# Patient Record
Sex: FEMALE | Race: WHITE | NOT HISPANIC OR LATINO | URBAN - METROPOLITAN AREA
[De-identification: names, ages, dates, MRNs, and addresses within clinical notes are randomized per-mention and may not be internally consistent; named-entity substitution may affect disease eponyms.]

---

## 2018-09-21 ENCOUNTER — APPOINTMENT (RX ONLY)
Dept: URBAN - METROPOLITAN AREA CLINIC 116 | Facility: CLINIC | Age: 53
Setting detail: DERMATOLOGY
End: 2018-09-21

## 2018-09-21 DIAGNOSIS — L65.9 NONSCARRING HAIR LOSS, UNSPECIFIED: ICD-10-CM

## 2018-09-21 PROCEDURE — ? TREATMENT REGIMEN

## 2018-09-21 PROCEDURE — ? OTHER

## 2018-09-21 PROCEDURE — 99202 OFFICE O/P NEW SF 15 MIN: CPT

## 2018-09-21 PROCEDURE — ? COUNSELING

## 2018-09-21 ASSESSMENT — LOCATION ZONE DERM: LOCATION ZONE: SCALP

## 2018-09-21 ASSESSMENT — LOCATION DETAILED DESCRIPTION DERM
LOCATION DETAILED: LEFT SUPERIOR PARIETAL SCALP
LOCATION DETAILED: MID-OCCIPITAL SCALP

## 2018-09-21 ASSESSMENT — LOCATION SIMPLE DESCRIPTION DERM
LOCATION SIMPLE: SCALP
LOCATION SIMPLE: POSTERIOR SCALP

## 2018-09-21 NOTE — PROCEDURE: OTHER
Note Text (......Xxx Chief Complaint.): This diagnosis correlates with the
Detail Level: Zone
Other (Free Text): Requested patient to get blood work sent to us. \\n\\nDiscussed with patient getting a punch biopsy, she can come one hour earlier and get topical numbing prior to biopsy.

## 2018-09-21 NOTE — PROCEDURE: TREATMENT REGIMEN
Detail Level: Zone
Plan: Recommended for patient to get blood work (hair pannel) and mammogram along with a colonoscopy. \\n\\nDiscussed vitalize
Otc Regimen: Viviscal vitamin\\nNioxin shampoo\\nMinoxidil 5% (rogaine)

## 2018-09-21 NOTE — HPI: HAIR LOSS
Previous Labs: Yes
How Did The Hair Loss Occur?: sudden in onset
How Severe Is Your Hair Loss?: mild
What Hair Products Do You Use?: Pantene for color treated hair
Additional History: Patient states shes experiencing  abnormal hair loss for the past 2 months. When she went to get her hair done her stylist advised her to see a DrDemetrio about her hair loss problem. She has discontinued dying her hair since then. Patient got blood work done by her pcp Dr. Karma Kahn in July. Vitamin D came back low, she is signing a releas form so we can get her labs. Patient is using regular shampoo and conditioner (Pantene for color treated hair). Patient has a history of being anemic(at a young age). Patient states she went on a no meat diet in March. But has not resumed her normal diet. Patient had a echocardiogram yesterday, for heart palpitations she was experiencing. She is awaiting results.

## 2023-03-16 ENCOUNTER — APPOINTMENT (RX ONLY)
Dept: URBAN - METROPOLITAN AREA CLINIC 114 | Facility: CLINIC | Age: 58
Setting detail: DERMATOLOGY
End: 2023-03-16

## 2023-03-16 DIAGNOSIS — L82.1 OTHER SEBORRHEIC KERATOSIS: ICD-10-CM

## 2023-03-16 DIAGNOSIS — Z41.9 ENCOUNTER FOR PROCEDURE FOR PURPOSES OTHER THAN REMEDYING HEALTH STATE, UNSPECIFIED: ICD-10-CM

## 2023-03-16 DIAGNOSIS — L30.8 OTHER SPECIFIED DERMATITIS: ICD-10-CM | Status: INADEQUATELY CONTROLLED

## 2023-03-16 DIAGNOSIS — L29.89 OTHER PRURITUS: ICD-10-CM

## 2023-03-16 DIAGNOSIS — L28.1 PRURIGO NODULARIS: ICD-10-CM

## 2023-03-16 PROBLEM — L29.8 OTHER PRURITUS: Status: ACTIVE | Noted: 2023-03-16

## 2023-03-16 PROCEDURE — ? PRESCRIPTION

## 2023-03-16 PROCEDURE — ? COSMETIC CONSULTATION: BOTOX

## 2023-03-16 PROCEDURE — ? COSMETIC CONSULTATION: FILLERS

## 2023-03-16 PROCEDURE — ? COUNSELING

## 2023-03-16 PROCEDURE — 99204 OFFICE O/P NEW MOD 45 MIN: CPT | Mod: 25

## 2023-03-16 PROCEDURE — ? PRESCRIPTION MEDICATION MANAGEMENT

## 2023-03-16 PROCEDURE — 17110 DESTRUCTION B9 LES UP TO 14: CPT

## 2023-03-16 PROCEDURE — ? BOTOX

## 2023-03-16 PROCEDURE — ? LIQUID NITROGEN

## 2023-03-16 RX ORDER — HALOBETASOL PROPIONATE 0.5 MG/G
AEROSOL, FOAM TOPICAL TWICE A DAY
Qty: 50 | Refills: 3 | Status: ERX | COMMUNITY
Start: 2023-03-16

## 2023-03-16 RX ADMIN — HALOBETASOL PROPIONATE: 0.5 AEROSOL, FOAM TOPICAL at 00:00

## 2023-03-16 ASSESSMENT — LOCATION ZONE DERM
LOCATION ZONE: HAND
LOCATION ZONE: LIP
LOCATION ZONE: TRUNK
LOCATION ZONE: FACE
LOCATION ZONE: LEG
LOCATION ZONE: FACE

## 2023-03-16 ASSESSMENT — LOCATION DETAILED DESCRIPTION DERM
LOCATION DETAILED: LEFT ANTERIOR DISTAL THIGH
LOCATION DETAILED: LEFT INFERIOR LATERAL FOREHEAD
LOCATION DETAILED: LEFT LATERAL ABDOMEN
LOCATION DETAILED: LEFT ORAL COMMISSURE
LOCATION DETAILED: LEFT INFERIOR MEDIAL FOREHEAD
LOCATION DETAILED: PHILTRUM
LOCATION DETAILED: RIGHT ULNAR PALM
LOCATION DETAILED: RIGHT THENAR EMINENCE
LOCATION DETAILED: LEFT THENAR EMINENCE
LOCATION DETAILED: RIGHT INFERIOR FOREHEAD
LOCATION DETAILED: SUPERIOR MID FOREHEAD
LOCATION DETAILED: RIGHT SUPERIOR FOREHEAD
LOCATION DETAILED: RIGHT MEDIAL FOREHEAD
LOCATION DETAILED: RIGHT FOREHEAD
LOCATION DETAILED: RIGHT MEDIAL BUCCAL CHEEK
LOCATION DETAILED: LEFT FOREHEAD
LOCATION DETAILED: RIGHT INFERIOR LATERAL FOREHEAD
LOCATION DETAILED: LEFT MEDIAL FOREHEAD
LOCATION DETAILED: RIGHT MEDIAL FOREHEAD
LOCATION DETAILED: LEFT INFERIOR FOREHEAD

## 2023-03-16 ASSESSMENT — LOCATION SIMPLE DESCRIPTION DERM
LOCATION SIMPLE: LEFT FOREHEAD
LOCATION SIMPLE: SUPERIOR FOREHEAD
LOCATION SIMPLE: RIGHT HAND
LOCATION SIMPLE: RIGHT CHEEK
LOCATION SIMPLE: RIGHT FOREHEAD
LOCATION SIMPLE: LEFT HAND
LOCATION SIMPLE: LEFT LIP
LOCATION SIMPLE: ABDOMEN
LOCATION SIMPLE: LEFT THIGH
LOCATION SIMPLE: RIGHT FOREHEAD
LOCATION SIMPLE: UPPER LIP

## 2023-03-16 ASSESSMENT — SEVERITY ASSESSMENT: SEVERITY: MILD TO MODERATE

## 2023-03-16 NOTE — PROCEDURE: LIQUID NITROGEN
Spray Paint Technique: No
Include Z78.9 (Other Specified Conditions Influencing Health Status) As An Associated Diagnosis?: Yes
Duration Of Freeze Thaw-Cycle (Seconds): 5-10
Consent: The patient's consent was obtained including but not limited to risks of crusting, scabbing, blistering, scarring, darker or lighter pigmentary change, recurrence, incomplete removal and infection.
Topical Anesthesia Type: liquid nitrogen
Number Of Freeze-Thaw Cycles: 2 freeze-thaw cycles
Spray Paint Text: The liquid nitrogen was applied to the skin utilizing a spray paint frosting technique.
Post-Care Instructions: I reviewed with the patient in detail post-care instructions. Patient is to wear sunprotection, and avoid picking at any of the treated lesions. Pt may apply Vaseline to crusted or scabbing areas.
Detail Level: Simple
Medical Necessity Information: It is in your best interest to select a reason for this procedure from the list below. All of these items fulfill various CMS LCD requirements except the new and changing color options.

## 2023-03-16 NOTE — PROCEDURE: BOTOX
Anterior Platysmal Bands Units: 0
Show Orbicularis Oculi Units: Yes
Show Right And Left Periorbital Units: No
Detail Level: Zone
Consent: Written consent obtained. Risks include but not limited to lid/brow ptosis, bruising, swelling, diplopia, temporary effect, incomplete chemical denervation.
Incrementing Botox Units: By 0.5 Units
Post-Care Instructions: Patient instructed to not lie down for 4 hours and limit physical activity for 24 hours.
Show Inventory Tab: Show
Expiration Date (Month Year): 06/2023
Lot #: [id number]
Dilution (U/0.1 Cc): 1

## 2023-04-14 ENCOUNTER — APPOINTMENT (RX ONLY)
Dept: URBAN - METROPOLITAN AREA CLINIC 114 | Facility: CLINIC | Age: 58
Setting detail: DERMATOLOGY
End: 2023-04-14

## 2023-04-14 DIAGNOSIS — Z41.9 ENCOUNTER FOR PROCEDURE FOR PURPOSES OTHER THAN REMEDYING HEALTH STATE, UNSPECIFIED: ICD-10-CM

## 2023-04-14 PROCEDURE — ? FILLERS

## 2023-04-14 PROCEDURE — ? PHOTO-DOCUMENTATION

## 2023-04-14 PROCEDURE — ? INVENTORY

## 2023-04-14 ASSESSMENT — LOCATION SIMPLE DESCRIPTION DERM
LOCATION SIMPLE: RIGHT LIP
LOCATION SIMPLE: LEFT LIP

## 2023-04-14 ASSESSMENT — LOCATION ZONE DERM: LOCATION ZONE: LIP

## 2023-04-14 ASSESSMENT — LOCATION DETAILED DESCRIPTION DERM
LOCATION DETAILED: LEFT INFERIOR VERMILION LIP
LOCATION DETAILED: RIGHT SUPERIOR VERMILION LIP

## 2023-04-14 NOTE — PROCEDURE: FILLERS
Additional Area 5 Volume In Cc: 0
Lot #: [id number]
Lot #: [id number]
Anesthesia Volume In Cc: 0.5
Include Cannula Information In Note?: No
Detail Level: Detailed
Aspiration Statement: Aspiration was performed prior to injecting site with filler.
Expiration Date (Month Year): 08-
Vermilion Lips Filler Volume In Cc: 1
Map Statment: See [address] for Complete Details
Filler: Juvederm Ultra Plus XC
Lot #: [id number]
Expiration Date (Month Year): 06-
Post-Care Instructions: Patient instructed to apply ice to reduce swelling.
Expiration Date (Month Year): 2023-03-04 & 2023-11-19
Consent: Written consent obtained. Risks include but not limited to bruising, beading, irregular texture, ulceration, infection, allergic reaction, scar formation, incomplete augmentation, temporary nature, procedural pain.
Additional Anesthesia Volume In Cc: 6

## 2023-05-04 ENCOUNTER — APPOINTMENT (RX ONLY)
Dept: URBAN - METROPOLITAN AREA CLINIC 114 | Facility: CLINIC | Age: 58
Setting detail: DERMATOLOGY
End: 2023-05-04

## 2023-05-04 DIAGNOSIS — Z41.9 ENCOUNTER FOR PROCEDURE FOR PURPOSES OTHER THAN REMEDYING HEALTH STATE, UNSPECIFIED: ICD-10-CM

## 2023-05-04 DIAGNOSIS — H02.40 UNSPECIFIED PTOSIS OF EYELID: ICD-10-CM

## 2023-05-04 PROBLEM — H02.409 UNSPECIFIED PTOSIS OF UNSPECIFIED EYELID: Status: ACTIVE | Noted: 2023-05-04

## 2023-05-04 PROCEDURE — ? PRESCRIPTION

## 2023-05-04 PROCEDURE — 99212 OFFICE O/P EST SF 10 MIN: CPT

## 2023-05-04 PROCEDURE — ? COSMETIC CONSULTATION: BOTOX

## 2023-05-04 PROCEDURE — ? BOTOX

## 2023-05-04 PROCEDURE — ? COSMETIC FOLLOW-UP

## 2023-05-04 PROCEDURE — ? COSMETIC CONSULTATION: FILLERS

## 2023-05-04 PROCEDURE — ? INVENTORY

## 2023-05-04 RX ORDER — OXYMETAZOLINE HYDROCHLORIDE OPHTHALMIC 1 MG/ML
SOLUTION/ DROPS OPHTHALMIC QD
Qty: 1 | Refills: 0 | COMMUNITY
Start: 2023-05-04

## 2023-05-04 RX ADMIN — OXYMETAZOLINE HYDROCHLORIDE OPHTHALMIC: 1 SOLUTION/ DROPS OPHTHALMIC at 00:00

## 2023-05-04 ASSESSMENT — LOCATION DETAILED DESCRIPTION DERM
LOCATION DETAILED: LEFT MEDIAL BUCCAL CHEEK
LOCATION DETAILED: RIGHT MEDIAL BUCCAL CHEEK
LOCATION DETAILED: RIGHT INFERIOR TEMPLE
LOCATION DETAILED: LEFT INFERIOR TEMPLE

## 2023-05-04 ASSESSMENT — LOCATION SIMPLE DESCRIPTION DERM
LOCATION SIMPLE: LEFT TEMPLE
LOCATION SIMPLE: RIGHT CHEEK
LOCATION SIMPLE: RIGHT TEMPLE
LOCATION SIMPLE: LEFT CHEEK

## 2023-05-04 ASSESSMENT — LOCATION ZONE DERM: LOCATION ZONE: FACE

## 2023-05-04 NOTE — PROCEDURE: BOTOX
Show Depressor Anguli Units: Yes
Periorbital Skin Units: 0
Detail Level: Zone
Show Lcl Units: No
Periorbital Skin Units: [address]
Incrementing Botox Units: By 0.1 Units
Lot #: [id number]
Show Inventory Tab: Show
Consent: Written consent obtained. Risks include but not limited to lid/brow ptosis, bruising, swelling, diplopia, temporary effect, incomplete chemical denervation.
Expiration Date (Month Year): 06/2023
Dilution (U/0.1 Cc): 1
Post-Care Instructions: Patient instructed to not lie down for 4 hours and limit physical activity for 24 hours.